# Patient Record
Sex: FEMALE | Race: WHITE | Employment: UNEMPLOYED | URBAN - METROPOLITAN AREA
[De-identification: names, ages, dates, MRNs, and addresses within clinical notes are randomized per-mention and may not be internally consistent; named-entity substitution may affect disease eponyms.]

---

## 2024-01-25 LAB
ABO, EXTERNAL RESULT: NORMAL
C. TRACHOMATIS, EXTERNAL RESULT: NEGATIVE
HEP B, EXTERNAL RESULT: NEGATIVE
HEPATITIS C ANTIBODY, EXTERNAL RESULT: NEGATIVE
HIV, EXTERNAL RESULT: NEGATIVE
N. GONORRHOEAE, EXTERNAL RESULT: NEGATIVE
RH FACTOR, EXTERNAL RESULT: POSITIVE
RPR, EXTERNAL RESULT: NON REACTIVE
RUBELLA TITER, EXTERNAL RESULT: NORMAL

## 2024-02-27 LAB — GBS, EXTERNAL RESULT: NEGATIVE

## 2024-03-08 ENCOUNTER — HOSPITAL ENCOUNTER (OUTPATIENT)
Facility: HOSPITAL | Age: 34
Discharge: HOME | End: 2024-03-08
Attending: OBSTETRICS & GYNECOLOGY | Admitting: OBSTETRICS & GYNECOLOGY
Payer: OTHER GOVERNMENT

## 2024-03-08 PROCEDURE — G0378 HOSPITAL OBSERVATION PER HR: HCPCS

## 2024-03-08 PROCEDURE — G0379 DIRECT REFER HOSPITAL OBSERV: HCPCS

## 2024-03-08 NOTE — H&P
Obstetrics Triage Note    Pt is a 33 y.o.female. at 38w. The patient presents with c/o ctx that started last night and got worse this afternoon.  States she called the office and spoke with someone who told her if she has more than 6 ctx in an hour to come to the hospital.  She also had decreased FM which has normalized since arrival to L&D.  The patient denies LOF, vaginal bleeding, N/V/F/C.  Pt reports good fetal movement.  Pregnancy uncomplicated.    No past medical history on file.    There were no vitals filed for this visit.    No data found.         EXAM:  Cervical Exam:  1-2/-3  Membranes:  Intact  Uterine Activity: None seen  Fetal Heart Rate: Reactive cat 1    Labs:  No results found for this or any previous visit (from the past 12 hour(s)).    @LASTPROCAMB([id number])@        ASSESSMENT:  IUP at 38 wks r/o labor    PLAN:   CE unchanged from office yesterday, not alex  FHT reassuring, good FM  Ok to discharge home, precautions reviewed.    Nathaly Tanner DO, FACOG  [facility]

## 2024-03-08 NOTE — PROGRESS NOTES
1436:  The patient presents to labor and delivery complaining of increased contractions since 6pm last night.      1559:  Dr. Tanner at the bedside, SVE is unchanged from the office where she was checked yesterday.      1615;  pt is discharged to home.  Discharge instructions given, pt verbalizes understanding.  Pt ambulated off of the unit with her .

## 2024-03-23 ENCOUNTER — HOSPITAL ENCOUNTER (OUTPATIENT)
Facility: HOSPITAL | Age: 34
Discharge: HOME | End: 2024-03-23
Attending: OBSTETRICS & GYNECOLOGY | Admitting: OBSTETRICS & GYNECOLOGY
Payer: OTHER GOVERNMENT

## 2024-03-23 VITALS
BODY MASS INDEX: 26.52 KG/M2 | OXYGEN SATURATION: 96 % | HEART RATE: 108 BPM | DIASTOLIC BLOOD PRESSURE: 66 MMHG | SYSTOLIC BLOOD PRESSURE: 95 MMHG | WEIGHT: 165 LBS | HEIGHT: 66 IN | RESPIRATION RATE: 16 BRPM | TEMPERATURE: 97.4 F

## 2024-03-23 PROCEDURE — 59025 FETAL NON-STRESS TEST: CPT

## 2024-03-23 PROCEDURE — G0378 HOSPITAL OBSERVATION PER HR: HCPCS

## 2024-03-23 PROCEDURE — 99212 OFFICE O/P EST SF 10 MIN: CPT

## 2024-03-23 PROCEDURE — 4A1HXCZ MONITORING OF PRODUCTS OF CONCEPTION, CARDIAC RATE, EXTERNAL APPROACH: ICD-10-PCS | Performed by: OBSTETRICS & GYNECOLOGY

## 2024-03-23 PROCEDURE — G0379 DIRECT REFER HOSPITAL OBSERV: HCPCS

## 2024-03-23 NOTE — PROGRESS NOTES
1235 Pt in room; changed into gown. Placed on EFM and TOCO. Pt reports she was having contractions last night with increasing frequency; q 2-5 minutes per pt. Pt denies LOF but reports slight spotting following membrane sweep yesterday. Pt endorses + fetal movement. Pt reports contraction pressure has increased throughout the morning which prompted her to seek treatment.     1315 Nicolette RANDOLPHM to bedside. Pt consents to SVE; 3-4 cm, 80%, -2.

## 2024-03-23 NOTE — H&P
History & Physical    Name: Karyn Manning MRN: [id number]  SSN: [id number]    YOB: 1990  Age: 33 y.o.  Sex: female        Subjective:     Estimated Date of Delivery: 3/23/24  OB History          2    Para   1    Term                AB        Living             SAB        IAB        Ectopic        Molar        Multiple        Live Births                    Ms. Manning is admitted with pregnancy at 40w0d with contractions off and on since 5 am. Reports she had a membrane sweep yesterday. Denies LOF. Reports some spotting as she wipes since sweep. Prenatal course was normal. Please see prenatal records for details.    Past Medical History:   Diagnosis Date    Anemia      History reviewed. No pertinent surgical history.  Social History     Occupational History    Not on file   Tobacco Use    Smoking status: Never    Smokeless tobacco: Never   Substance and Sexual Activity    Alcohol use: Not Currently    Drug use: Never    Sexual activity: Yes     Partners: Male     No family history on file.    No Known Allergies  Prior to Admission medications    Medication Sig Start Date End Date Taking? Authorizing Provider   Prenatal Vit-Fe Fumarate-FA (PRENATAL VITAMINS PO) Take by mouth    Provider, MD Félix        Review of Systems: Pertinent items are noted in HPI.    Objective:     Vitals:  Vitals:    24 1240   BP: 95/66   Pulse: (!) 108   Resp: 16   Temp: 97.4 °F (36.3 °C)   TempSrc: Oral   SpO2: 96%   Weight: 74.8 kg (165 lb)   Height: 1.676 m (5' 6\")        Physical Exam:  Patient without distress  Heart: Regular rate and rhythm  Lung: no wheezes, no rales, no rhonchi and normal respiratory effort  Abdomen: soft, nontender  Fundus: firm and non tender  Perineum: blood absent, amniotic fluid absent  Cervical Exam: 3 cm dilated    80% effaced    -2 station    Presenting Part: cephalic  Consistency: Soft  Lower Extremities:  - Edema No  Membranes:  Intact  Fetal Heart Rate:

## 2024-03-23 NOTE — PROGRESS NOTES
Labor Progress Note     Patient: Karyn Manning MRN: [id number]  SSN: [id number]    YOB: 1990  Age: 33 y.o.  Sex: female        Subjective:   Patient evaluated and reports contractions not intensified, doesn't feel like she is in labor  Objective:   Blood pressure 95/66, pulse (!) 108, temperature 97.4 °F (36.3 °C), temperature source Oral, resp. rate 16, height 1.676 m (5' 6\"), weight 74.8 kg (165 lb), last menstrual period 2023, SpO2 96 %.    Sterile Vaginal Exam: 3/80/  Membranes:  intact  EFM:  - Fetal Heart Rate: reactive    - Uterine Activity: irregular        Assessment:    SIUP @ 40w0d   Early labor vs. False labor  GBS neg  Category 1 fetal heart rate tracing   Plan:   No cervical change in 2 hours  Offered AOL with AROM or D/C  Patient desires D/C  Reviewed when to return   All questions answered, and pt/partner agree with plan of care      Signed By:  DELIO Cantrell CNM      3/23/2024 3:53 PM

## 2024-03-24 ENCOUNTER — HOSPITAL ENCOUNTER (INPATIENT)
Facility: HOSPITAL | Age: 34
LOS: 2 days | Discharge: HOME | End: 2024-03-26
Attending: OBSTETRICS & GYNECOLOGY | Admitting: OBSTETRICS & GYNECOLOGY
Payer: OTHER GOVERNMENT

## 2024-03-24 PROBLEM — O42.90 AMNIOTIC FLUID LEAKING: Status: ACTIVE | Noted: 2024-03-24

## 2024-03-24 PROBLEM — Z34.90 TERM PREGNANCY: Status: ACTIVE | Noted: 2024-03-24

## 2024-03-24 PROBLEM — Z3A.40 40 WEEKS GESTATION OF PREGNANCY: Status: ACTIVE | Noted: 2024-03-24

## 2024-03-24 LAB
ERYTHROCYTE [DISTWIDTH] IN BLOOD BY AUTOMATED COUNT: 13.4 % (ref 11.5–14.5)
HCT VFR BLD AUTO: 35.2 % (ref 35–47)
HGB BLD-MCNC: 11.8 G/DL (ref 11.5–16)
MCH RBC QN AUTO: 27 PG (ref 26–34)
MCHC RBC AUTO-ENTMCNC: 33.5 G/DL (ref 30–36.5)
MCV RBC AUTO: 80.5 FL (ref 80–99)
NRBC # BLD: 0 K/UL (ref 0–0.01)
NRBC BLD-RTO: 0 PER 100 WBC
PLATELET # BLD AUTO: 204 K/UL (ref 150–400)
PMV BLD AUTO: 10.5 FL (ref 8.9–12.9)
RBC # BLD AUTO: 4.37 M/UL (ref 3.8–5.2)
WBC # BLD AUTO: 17.1 K/UL (ref 3.6–11)

## 2024-03-24 PROCEDURE — 86850 RBC ANTIBODY SCREEN: CPT

## 2024-03-24 PROCEDURE — 1100000000 HC RM PRIVATE

## 2024-03-24 PROCEDURE — 86901 BLOOD TYPING SEROLOGIC RH(D): CPT

## 2024-03-24 PROCEDURE — 85027 COMPLETE CBC AUTOMATED: CPT

## 2024-03-24 PROCEDURE — 7210000100 HC LABOR FEE PER 1 HR: Performed by: ADVANCED PRACTICE MIDWIFE

## 2024-03-24 PROCEDURE — 86592 SYPHILIS TEST NON-TREP QUAL: CPT

## 2024-03-24 PROCEDURE — 86900 BLOOD TYPING SEROLOGIC ABO: CPT

## 2024-03-24 PROCEDURE — A4216 STERILE WATER/SALINE, 10 ML: HCPCS | Performed by: OBSTETRICS & GYNECOLOGY

## 2024-03-24 PROCEDURE — 2580000003 HC RX 258: Performed by: OBSTETRICS & GYNECOLOGY

## 2024-03-24 PROCEDURE — 2500000003 HC RX 250 WO HCPCS: Performed by: OBSTETRICS & GYNECOLOGY

## 2024-03-24 PROCEDURE — 36415 COLL VENOUS BLD VENIPUNCTURE: CPT

## 2024-03-24 RX ORDER — FENTANYL/BUPIVACAINE/NS/PF 2-1250MCG
12 PLASTIC BAG, INJECTION (ML) INJECTION CONTINUOUS
Status: DISCONTINUED | OUTPATIENT
Start: 2024-03-25 | End: 2024-03-25

## 2024-03-24 RX ORDER — ACETAMINOPHEN 325 MG/1
650 TABLET ORAL EVERY 4 HOURS PRN
Status: DISCONTINUED | OUTPATIENT
Start: 2024-03-24 | End: 2024-03-25

## 2024-03-24 RX ORDER — EPHEDRINE SULFATE/0.9% NACL/PF 25 MG/5 ML
10 SYRINGE (ML) INTRAVENOUS EVERY 5 MIN PRN
Status: DISCONTINUED | OUTPATIENT
Start: 2024-03-24 | End: 2024-03-25

## 2024-03-24 RX ORDER — SODIUM CHLORIDE, SODIUM LACTATE, POTASSIUM CHLORIDE, AND CALCIUM CHLORIDE .6; .31; .03; .02 G/100ML; G/100ML; G/100ML; G/100ML
1000 INJECTION, SOLUTION INTRAVENOUS PRN
Status: DISCONTINUED | OUTPATIENT
Start: 2024-03-24 | End: 2024-03-25

## 2024-03-24 RX ORDER — SODIUM CHLORIDE, SODIUM LACTATE, POTASSIUM CHLORIDE, AND CALCIUM CHLORIDE .6; .31; .03; .02 G/100ML; G/100ML; G/100ML; G/100ML
500 INJECTION, SOLUTION INTRAVENOUS PRN
Status: DISCONTINUED | OUTPATIENT
Start: 2024-03-24 | End: 2024-03-25

## 2024-03-24 RX ORDER — TERBUTALINE SULFATE 1 MG/ML
0.25 INJECTION, SOLUTION SUBCUTANEOUS
Status: DISCONTINUED | OUTPATIENT
Start: 2024-03-24 | End: 2024-03-25

## 2024-03-24 RX ORDER — ONDANSETRON 2 MG/ML
4 INJECTION INTRAMUSCULAR; INTRAVENOUS EVERY 6 HOURS PRN
Status: DISCONTINUED | OUTPATIENT
Start: 2024-03-24 | End: 2024-03-25

## 2024-03-24 RX ORDER — NALOXONE HYDROCHLORIDE 0.4 MG/ML
INJECTION, SOLUTION INTRAMUSCULAR; INTRAVENOUS; SUBCUTANEOUS PRN
Status: DISCONTINUED | OUTPATIENT
Start: 2024-03-24 | End: 2024-03-25

## 2024-03-24 RX ADMIN — SODIUM CHLORIDE, POTASSIUM CHLORIDE, SODIUM LACTATE AND CALCIUM CHLORIDE 1000 ML: 600; 310; 30; 20 INJECTION, SOLUTION INTRAVENOUS at 22:06

## 2024-03-24 RX ADMIN — FAMOTIDINE 20 MG: 10 INJECTION, SOLUTION INTRAVENOUS at 22:24

## 2024-03-24 RX ADMIN — SODIUM CHLORIDE, POTASSIUM CHLORIDE, SODIUM LACTATE AND CALCIUM CHLORIDE 1000 ML: 600; 310; 30; 20 INJECTION, SOLUTION INTRAVENOUS at 23:40

## 2024-03-25 ENCOUNTER — ANESTHESIA EVENT (OUTPATIENT)
Facility: HOSPITAL | Age: 34
End: 2024-03-25
Payer: OTHER GOVERNMENT

## 2024-03-25 ENCOUNTER — ANESTHESIA (OUTPATIENT)
Facility: HOSPITAL | Age: 34
End: 2024-03-25
Payer: OTHER GOVERNMENT

## 2024-03-25 LAB
ABO + RH BLD: NORMAL
BLOOD GROUP ANTIBODIES SERPL: NORMAL
RPR SER QL: NONREACTIVE
SPECIMEN EXP DATE BLD: NORMAL

## 2024-03-25 PROCEDURE — 1120000000 HC RM PRIVATE OB

## 2024-03-25 PROCEDURE — 7220000101 HC DELIVERY VAGINAL/SINGLE: Performed by: ADVANCED PRACTICE MIDWIFE

## 2024-03-25 PROCEDURE — 6360000002 HC RX W HCPCS: Performed by: OBSTETRICS & GYNECOLOGY

## 2024-03-25 PROCEDURE — 00HU33Z INSERTION OF INFUSION DEVICE INTO SPINAL CANAL, PERCUTANEOUS APPROACH: ICD-10-PCS | Performed by: ANESTHESIOLOGY

## 2024-03-25 PROCEDURE — 2500000003 HC RX 250 WO HCPCS: Performed by: ANESTHESIOLOGY

## 2024-03-25 PROCEDURE — 3700000025 EPIDURAL BLOCK: Performed by: ANESTHESIOLOGY

## 2024-03-25 PROCEDURE — 2580000003 HC RX 258: Performed by: OBSTETRICS & GYNECOLOGY

## 2024-03-25 PROCEDURE — 7210000100 HC LABOR FEE PER 1 HR: Performed by: ADVANCED PRACTICE MIDWIFE

## 2024-03-25 PROCEDURE — 6370000000 HC RX 637 (ALT 250 FOR IP): Performed by: OBSTETRICS & GYNECOLOGY

## 2024-03-25 PROCEDURE — 94761 N-INVAS EAR/PLS OXIMETRY MLT: CPT

## 2024-03-25 PROCEDURE — 3700000156 HC EPIDURAL ANESTHESIA: Performed by: ANESTHESIOLOGY

## 2024-03-25 PROCEDURE — 6370000000 HC RX 637 (ALT 250 FOR IP): Performed by: ADVANCED PRACTICE MIDWIFE

## 2024-03-25 PROCEDURE — 51702 INSERT TEMP BLADDER CATH: CPT

## 2024-03-25 RX ORDER — SODIUM CHLORIDE 9 MG/ML
INJECTION, SOLUTION INTRAVENOUS PRN
Status: DISCONTINUED | OUTPATIENT
Start: 2024-03-25 | End: 2024-03-26 | Stop reason: HOSPADM

## 2024-03-25 RX ORDER — LANOLIN/MINERAL OIL
LOTION (ML) TOPICAL PRN
Status: DISCONTINUED | OUTPATIENT
Start: 2024-03-25 | End: 2024-03-26 | Stop reason: HOSPADM

## 2024-03-25 RX ORDER — ONDANSETRON 4 MG/1
4 TABLET, ORALLY DISINTEGRATING ORAL EVERY 6 HOURS PRN
Status: DISCONTINUED | OUTPATIENT
Start: 2024-03-25 | End: 2024-03-25

## 2024-03-25 RX ORDER — SODIUM CHLORIDE, SODIUM LACTATE, POTASSIUM CHLORIDE, CALCIUM CHLORIDE 600; 310; 30; 20 MG/100ML; MG/100ML; MG/100ML; MG/100ML
INJECTION, SOLUTION INTRAVENOUS CONTINUOUS
Status: DISCONTINUED | OUTPATIENT
Start: 2024-03-25 | End: 2024-03-25

## 2024-03-25 RX ORDER — FENTANYL CITRATE 50 UG/ML
INJECTION, SOLUTION INTRAMUSCULAR; INTRAVENOUS PRN
Status: DISCONTINUED | OUTPATIENT
Start: 2024-03-25 | End: 2024-03-25 | Stop reason: SDUPTHER

## 2024-03-25 RX ORDER — SODIUM CHLORIDE 0.9 % (FLUSH) 0.9 %
5-40 SYRINGE (ML) INJECTION EVERY 12 HOURS SCHEDULED
Status: DISCONTINUED | OUTPATIENT
Start: 2024-03-25 | End: 2024-03-26 | Stop reason: HOSPADM

## 2024-03-25 RX ORDER — LIDOCAINE HYDROCHLORIDE AND EPINEPHRINE 15; 5 MG/ML; UG/ML
INJECTION, SOLUTION EPIDURAL PRN
Status: DISCONTINUED | OUTPATIENT
Start: 2024-03-25 | End: 2024-03-25 | Stop reason: SDUPTHER

## 2024-03-25 RX ORDER — CARBOPROST TROMETHAMINE 250 UG/ML
250 INJECTION, SOLUTION INTRAMUSCULAR PRN
Status: DISCONTINUED | OUTPATIENT
Start: 2024-03-25 | End: 2024-03-25

## 2024-03-25 RX ORDER — ONDANSETRON 2 MG/ML
4 INJECTION INTRAMUSCULAR; INTRAVENOUS EVERY 6 HOURS PRN
Status: DISCONTINUED | OUTPATIENT
Start: 2024-03-25 | End: 2024-03-25

## 2024-03-25 RX ORDER — METHYLERGONOVINE MALEATE 0.2 MG/ML
200 INJECTION INTRAVENOUS PRN
Status: DISCONTINUED | OUTPATIENT
Start: 2024-03-25 | End: 2024-03-25

## 2024-03-25 RX ORDER — IBUPROFEN 800 MG/1
800 TABLET ORAL EVERY 8 HOURS SCHEDULED
Status: DISCONTINUED | OUTPATIENT
Start: 2024-03-25 | End: 2024-03-26 | Stop reason: HOSPADM

## 2024-03-25 RX ORDER — SODIUM CHLORIDE 0.9 % (FLUSH) 0.9 %
5-40 SYRINGE (ML) INJECTION PRN
Status: DISCONTINUED | OUTPATIENT
Start: 2024-03-25 | End: 2024-03-26 | Stop reason: HOSPADM

## 2024-03-25 RX ORDER — ACETAMINOPHEN 500 MG
1000 TABLET ORAL ONCE
Status: COMPLETED | OUTPATIENT
Start: 2024-03-25 | End: 2024-03-25

## 2024-03-25 RX ORDER — DOCUSATE SODIUM 100 MG/1
100 CAPSULE, LIQUID FILLED ORAL 2 TIMES DAILY
Status: DISCONTINUED | OUTPATIENT
Start: 2024-03-25 | End: 2024-03-26 | Stop reason: HOSPADM

## 2024-03-25 RX ORDER — TRANEXAMIC ACID 10 MG/ML
1000 INJECTION, SOLUTION INTRAVENOUS
Status: DISCONTINUED | OUTPATIENT
Start: 2024-03-25 | End: 2024-03-25

## 2024-03-25 RX ORDER — MISOPROSTOL 200 UG/1
400 TABLET ORAL PRN
Status: DISCONTINUED | OUTPATIENT
Start: 2024-03-25 | End: 2024-03-25

## 2024-03-25 RX ORDER — ACETAMINOPHEN 500 MG
1000 TABLET ORAL EVERY 8 HOURS SCHEDULED
Status: DISCONTINUED | OUTPATIENT
Start: 2024-03-25 | End: 2024-03-26 | Stop reason: HOSPADM

## 2024-03-25 RX ORDER — BUPIVACAINE HYDROCHLORIDE AND EPINEPHRINE 2.5; 5 MG/ML; UG/ML
INJECTION, SOLUTION EPIDURAL; INFILTRATION; INTRACAUDAL; PERINEURAL PRN
Status: DISCONTINUED | OUTPATIENT
Start: 2024-03-25 | End: 2024-03-25 | Stop reason: SDUPTHER

## 2024-03-25 RX ADMIN — LIDOCAINE HYDROCHLORIDE AND EPINEPHRINE 4.5 ML: 15; 5 INJECTION, SOLUTION EPIDURAL at 00:08

## 2024-03-25 RX ADMIN — OXYTOCIN 999 MILLI-UNITS/MIN: 10 INJECTION, SOLUTION INTRAMUSCULAR; INTRAVENOUS at 04:55

## 2024-03-25 RX ADMIN — SODIUM CHLORIDE, POTASSIUM CHLORIDE, SODIUM LACTATE AND CALCIUM CHLORIDE: 600; 310; 30; 20 INJECTION, SOLUTION INTRAVENOUS at 03:38

## 2024-03-25 RX ADMIN — DOCUSATE SODIUM 100 MG: 100 CAPSULE, LIQUID FILLED ORAL at 08:01

## 2024-03-25 RX ADMIN — SODIUM CHLORIDE, POTASSIUM CHLORIDE, SODIUM LACTATE AND CALCIUM CHLORIDE: 600; 310; 30; 20 INJECTION, SOLUTION INTRAVENOUS at 02:32

## 2024-03-25 RX ADMIN — BUPIVACAINE HYDROCHLORIDE AND EPINEPHRINE 8 ML: 2.5; 5 INJECTION, SOLUTION EPIDURAL; INFILTRATION; INTRACAUDAL; PERINEURAL at 03:57

## 2024-03-25 RX ADMIN — IBUPROFEN 800 MG: 800 TABLET, FILM COATED ORAL at 08:01

## 2024-03-25 RX ADMIN — SODIUM CHLORIDE 5 MILLION UNITS: 900 INJECTION INTRAVENOUS at 00:55

## 2024-03-25 RX ADMIN — FENTANYL CITRATE 100 MCG: 50 INJECTION, SOLUTION INTRAMUSCULAR; INTRAVENOUS at 03:57

## 2024-03-25 RX ADMIN — ACETAMINOPHEN 1000 MG: 500 TABLET ORAL at 00:57

## 2024-03-25 RX ADMIN — Medication 12 ML/HR: at 00:20

## 2024-03-25 RX ADMIN — IBUPROFEN 800 MG: 800 TABLET, FILM COATED ORAL at 17:54

## 2024-03-25 RX ADMIN — LIDOCAINE HYDROCHLORIDE AND EPINEPHRINE 3 ML: 15; 5 INJECTION, SOLUTION EPIDURAL at 00:03

## 2024-03-25 ASSESSMENT — PAIN DESCRIPTION - LOCATION
LOCATION: ABDOMEN

## 2024-03-25 ASSESSMENT — PAIN DESCRIPTION - ORIENTATION
ORIENTATION: MID
ORIENTATION: LOWER

## 2024-03-25 ASSESSMENT — PAIN SCALES - GENERAL
PAINLEVEL_OUTOF10: 2
PAINLEVEL_OUTOF10: 4

## 2024-03-25 ASSESSMENT — ENCOUNTER SYMPTOMS
VOMITING: 0
DIARRHEA: 0
NAUSEA: 0
CONSTIPATION: 0
CHEST TIGHTNESS: 0
RHINORRHEA: 0
EYE PAIN: 0
SHORTNESS OF BREATH: 0
BACK PAIN: 0
TROUBLE SWALLOWING: 0

## 2024-03-25 ASSESSMENT — PAIN DESCRIPTION - DESCRIPTORS
DESCRIPTORS: SORE
DESCRIPTORS: SORE

## 2024-03-25 ASSESSMENT — PAIN - FUNCTIONAL ASSESSMENT
PAIN_FUNCTIONAL_ASSESSMENT: ACTIVITIES ARE NOT PREVENTED
PAIN_FUNCTIONAL_ASSESSMENT: ACTIVITIES ARE NOT PREVENTED

## 2024-03-25 NOTE — ANESTHESIA PROCEDURE NOTES
Epidural Block    Patient location during procedure: OB  Start time: 3/24/2024 11:58 PM  End time: 3/25/2024 12:03 AM  Reason for block: labor epidural  Staffing  Anesthesiologist: Harrison Gambino MD  Performed by: Harrison Gambino MD  Authorized by: Harrison Gambino MD    Epidural  Patient position: sitting  Prep: Betadine  Patient monitoring: frequent blood pressure checks and continuous pulse ox  Approach: midline  Location: L3-4  Injection technique: DUSTY air  Provider prep: mask and sterile gloves  Needle  Needle type: Tuohy   Needle gauge: 17 G  Needle length: 3.5 in  Needle insertion depth: 4 cm  Catheter type: multi-orifice  Catheter size: 20 G  Catheter at skin depth: 9 cm  Test dose: negativeCatheter Secured: tegaderm and tape  Assessment  Hemodynamics: stable  Attempts: 1  Outcomes: patient tolerated procedure well  Additional Notes  Pt voiced immediate relief   Tolerated well  PCEA explained AVU   See TD for BP/HR/Sats/FHT all ok

## 2024-03-25 NOTE — LACTATION NOTE
This note was copied from a baby's chart.  Mother states baby has latched with assistance and nursed successfully. Mother states, \"I was told my baby has a tongue tie.\" Mother states her first child (now 6 also was diagnosed with tongue tie at age 3).  Offered to assess a latch and work with mother through breastfeeding techniques.   Mother declines assistance with breastfeeding at this time.  Asked mother if helpful to work through evidence based practice orofacial exercises including suck training.  Mother declines at this time. Mother encouraged to call next feed.     Discussed with mother her plan for feeding.  Reviewed the benefits of exclusive breast milk feeding during the hospital stay.   Informed her of the risks of using formula to supplement in the first few days of life as well as the benefits of successful breast milk feeding; referred her to the Breastfeeding booklet about this information.   She acknowledges understanding of information reviewed and states that it is her plan to breastfeed her infant.  Will support her choice and offer additional information as needed.     Reviewed breastfeeding basics:  How milk is made and normal  breastfeeding behaviors discussed.  Supply and demand,  stomach size, early feeding cues, skin to skin bonding with comfortable positioning and baby led latch-on reviewed.  How to identify signs of successful breastfeeding sessions reviewed; education on asymetrical latch, signs of effective latching vs shallow, in-effective latching, normal  feeding frequency and duration and expected infant output discussed.  Normal course of breastfeeding discussed including the AAP's recommendation that children receive exclusive breast milk feedings for the first six months of life with breast milk feedings to continue through the first year of life and/or beyond as complimentary table foods are added.  Breastfeeding Booklet and Warm line information provided with

## 2024-03-25 NOTE — H&P
History & Physical    Name: Karyn Manning MRN: [id number]  SSN: [id number]    YOB: 1990  Age: 33 y.o.  Sex: female        Estimated Date of Delivery: 3/23/24  OB History    Para Term  AB Living   2 1 1     1   SAB IAB Ectopic Molar Multiple Live Births             1      # Outcome Date GA Lbr Yousif/2nd Weight Sex Delivery Anes PTL Lv   2 Current            1 Term     M Vag-Spont   TABATHA       CC: SROM    Ms. Manning is a 34 yo  @ 40w1d, JAIRO 3/23/24, pt of Dr Almazan, who presents with complaint of SROM between 8 to 820pm. Ctxs q 5 mins. Patient was triaged on l&d yesterday, discharged to home with no change in cervix at 3cm/80%/-2. She lost her mucus plug- scant blood noted in the discharge. +FM. Prenatal course was normal.  Please see prenatal records for details. Patient's  at bedside.     Past Medical History:   Diagnosis Date    Anemia      History reviewed. No pertinent surgical history.  Social History     Occupational History    Not on file   Tobacco Use    Smoking status: Never    Smokeless tobacco: Never   Substance and Sexual Activity    Alcohol use: Not Currently    Drug use: Never    Sexual activity: Yes     Partners: Male     Comment: denies h/o STIs     Family History   Problem Relation Age of Onset    No Known Problems Mother     No Known Problems Father        Allergies   Allergen Reactions    Escitalopram Hallucinations    Other Rash     PEANUTS     Prior to Admission medications    Medication Sig Start Date End Date Taking? Authorizing Provider   Prenatal Vit-Fe Fumarate-FA (PRENATAL VITAMINS PO) Take by mouth    Provider, Félix, MD        Review of Systems   Constitutional:  Negative for chills, fatigue and fever (but feels hot).   HENT:  Negative for rhinorrhea and trouble swallowing.    Eyes:  Negative for pain.   Respiratory:  Negative for chest tightness and shortness of breath.    Cardiovascular:  Negative for chest pain.   Gastrointestinal:

## 2024-03-25 NOTE — DISCHARGE INSTRUCTIONS
in your phone.  Go to [employer] for more information or to chat online.  Call your doctor now or seek immediate medical care if:    You are having trouble caring for yourself or your baby.     You hear voices.   Contact your doctor if:    You have problems with your medicines.     You do not get better as expected.   Follow-up care is a key part of your treatment and safety. Be sure to make and go to all appointments, and call your doctor if you are having problems. It's also a good idea to know your test results and keep a list of the medicines you take.  Where can you learn more?  Go to [email or web address]://[email or web address] and enter Y765 to learn more about \"Depression After Childbirth: Care Instructions.\"  Current as of: June 24, 2023               Content Version: 14.0  © 2006-2024 [employer].   Care instructions adapted under license by [employer]. If you have questions about a medical condition or this instruction, always ask your healthcare professional. [employer] disclaims any warranty or liability for your use of this information.

## 2024-03-25 NOTE — ANESTHESIA POSTPROCEDURE EVALUATION
Department of Anesthesiology  Postprocedure Note    Patient: Karyn Manning  MRN: [id number]  YOB: 1990  Date of evaluation: 3/25/2024    Procedure Summary       Date: 03/24/24 Room / Location:     Anesthesia Start: 2358 Anesthesia Stop: 03/25/24 0448    Procedure: Labor Analgesia Diagnosis:     Scheduled Providers:  Responsible Provider: Harrison Gambino MD    Anesthesia Type: epidural ASA Status: 2 - Emergent            Anesthesia Type: No value filed.    Patrick Phase I:      Patrick Phase II:      Anesthesia Post Evaluation    No notable events documented.

## 2024-03-25 NOTE — LACTATION NOTE
This note was copied from a baby's chart.  Rounding for LC consult.  Primary RN assisted to latch mother in cradled position.  Reviewed good alignment to achieve deep latch (body to belly; nose to nipple, lead with chin).  Noting that during nursing, infant seems to be recruiting jaw in a piston like motion to remove milk, likely resulting in the \"pinchy\" feeling described by mother.  Orofacial exercises reviewed with parents. Goal is to decrease reliance on cheek/jaw compensations and strengthen tongue.  Community resources shared for SLP/IBCLC. Reviewed breastfeeding basics and output expectations with parents.  Mother aware to call for assistance with feeds.     Orofacial exercises are light touch, gentle hands on techniques predominantly involving the gape response, facilitating tongue movement, and promote motor awareness.  It can improve mobility of the tissues by decreasing the restriction and tension patterns in the face and increase optimal latch.  Pt will successfully establish breastfeeding by feeding in response to early feeding cues   or wake every 3h, will obtain deep latch, and will keep log of feedings/output.  Taught to BF at hunger cues and or q 2-3 hrs and to offer 10-20 drops of hand expressed colostrum at any non-feeds.      Left Breast:  (did not evalutate this consult)           Position and Latch: With assistance     Maternal Response: Attentive           Latch: Grasps breast, tongue down, lips flanged, rhythmic sucking  Audible Swallowing: A few with stimulation  Type of Nipple: Everted (after stimulation)  Comfort (Breast/Nipple): Filling, red/small blisters/bruises, mild/mod discomfort (feels \"pinchy\")  Hold (Positioning): Full assist, teach one side, mother does other, staff holds  LATCH Score: 7

## 2024-03-25 NOTE — ANESTHESIA PRE PROCEDURE
Department of Anesthesiology  Preprocedure Note       Name:  Karyn Manning   Age:  33 y.o.  :  1990                                          MRN:  [id number]         Date:  3/25/2024      Surgeon: * Surgery not found *    Procedure:     Medications prior to admission:   Prior to Admission medications    Medication Sig Start Date End Date Taking? Authorizing Provider   Prenatal Vit-Fe Fumarate-FA (PRENATAL VITAMINS PO) Take by mouth    Provider, MD Félix       Current medications:    Current Facility-Administered Medications   Medication Dose Route Frequency Provider Last Rate Last Admin   • terbutaline (BRETHINE) injection 0.25 mg  0.25 mg SubCUTAneous Once PRN Brock Swanson MD       • lactated ringers bolus 500 mL  500 mL IntraVENous PRN Brock Swanson .7 mL/hr at 24 2227 Restarted at 24 222    Or   • lactated ringers bolus 1,000 mL  1,000 mL IntraVENous PRN Brock Swanson .9 mL/hr at 24 2340 1,000 mL at 24 2340   • acetaminophen (TYLENOL) tablet 650 mg  650 mg Oral Q4H PRN Brock Swanson MD       • fentaNYL 2 mcg/mL BUPivacaine 0.125% in sodium chloride 0.9% 100 mL epidural infusion  12 mL/hr Epidural Continuous Harrison Gambino MD       • naloxone 0.4 mg in 10 mL sodium chloride syringe   IntraVENous PRN Harrison Gambino MD       • ondansetron (ZOFRAN) injection 4 mg  4 mg IntraVENous Q6H PRN Harrison Gambino MD       • ePHEDrine injection 10 mg  10 mg IntraVENous Q5 Min PRN Harrison Gambino MD           Allergies:    Allergies   Allergen Reactions   • Escitalopram Hallucinations   • Other Rash     PEANUTS       Problem List:    Patient Active Problem List   Diagnosis Code   • Term pregnancy Z34.90   • 40 weeks gestation of pregnancy Z3A.40   • Amniotic fluid leaking O42.90       Past Medical History:        Diagnosis Date   • Anemia        Past Surgical History:  History reviewed. No pertinent surgical history.    Social

## 2024-03-25 NOTE — DISCHARGE SUMMARY
Obstetrical Discharge Summary     Name: Karyn Manning MRN: [id number]  SSN: [id number]    YOB: 1990  Age: 33 y.o.  Sex: female      Admit Date: 3/24/2024    Discharge Date: 3/26/2024     Attending Physician:  Kyra Young MD     Delivering Physician:  GALI Marquez     * Admission Diagnoses:   IUP @ 40w2d      * Discharge Diagnoses:   Delivery of a VFI via TSVD by Kyra Young MD on 3/25/2024.  Apgars were 8 and 9.      No lacerations      Additional Diagnoses:      No results found for: \"RUBELLAEXT\", \"GRBSEXT\"   Immunization History   Administered Date(s) Administered    COVID-19, PFIZER PURPLE top, DILUTE for use, (age 12 y+), 30mcg/0.3mL 05/03/2021, 05/24/2021       * Procedures:   TSVD         * Discharge Condition: good    * Hospital Course: Normal hospital course following the delivery.    * Disposition: Home    Discharge Medications:      Medication List        ASK your doctor about these medications      PRENATAL VITAMINS PO              * Follow-up Care/Patient Instructions:  Activity: Activity as tolerated  Diet: Regular Diet  Wound Care: As directed      Followup in 7-14 days and then at 6 weeks for PP check        Signed By:  Kyra Young MD     March 25, 2024

## 2024-03-25 NOTE — L&D DELIVERY NOTE
Vessels  Complications: None  Delayed Cord Clamping?: Yes  Cord Clamped Date/Time: 3/25/2024 04:50:05  Cord Blood Disposition: Lab  Gases Sent?: No              Placenta    Date/Time: 3/25/2024 04:52:42  Removal: Expressed  Appearance: Intact  Disposition: Discarded       Lacerations    Episiotomy: None  Perineal Lacerations: None  Other Lacerations: no non-perineal laceration  Number of Repair Packets: 1       Vaginal Counts    Initial Count Personnel: ALYSON WARREN RN  Initial Count Verified By: STACI ASKEW CNM  Intial Sponge Count: Correct Intial Needles Count: Correct Intial Instruments Count: Correct   Final Sponges Count: Correct Final Needles  Count: Correct Final Instruments Count: Correct   Final Count Personnel: STACI ASKEW CNM  Final Count Verified By: ALYSON WARREN RN  Accurate Final Count?: No       Blood Loss  Mother: Karyn Manning #[id number]     Start of Mother's Information      Delivery Blood Loss  24 1648 - 24 0527      Quantitative Blood Loss (mL) Hospital Encounter 150 grams    Total  150 mL               End of Mother's Information  Mother: Karyn Manning #[id number]                Delivery Providers    Delivering clinician: Bessy Askew APRN - CNM     Provider Role    Bessy Askew APRN - CNM Midwife    Tamie Berry RN Primary Nurse    Deanna Hall RN Primary  Nurse    Mere Toure RN Charge Nurse    Alyson Warren RN Staff Nurse               Assessment    Living Status: Living        Skin Color:   Heart Rate:   Reflex Irritability:   Muscle Tone:   Respiratory Effort:   Total:            1 Minute:    0    2    2    2    2    8         5 Minute:    1    2    2    2    2    9                                        Apgars Assigned By: STACI HALL RN              Resuscitation    Method: Stimulation, Bulb Suction, Room Air             [place] Measurements

## 2024-03-26 VITALS
OXYGEN SATURATION: 98 % | DIASTOLIC BLOOD PRESSURE: 63 MMHG | RESPIRATION RATE: 16 BRPM | HEART RATE: 93 BPM | TEMPERATURE: 97.9 F | SYSTOLIC BLOOD PRESSURE: 95 MMHG

## 2024-03-26 PROBLEM — Z34.90 TERM PREGNANCY: Status: RESOLVED | Noted: 2024-03-24 | Resolved: 2024-03-26

## 2024-03-26 PROBLEM — Z3A.40 40 WEEKS GESTATION OF PREGNANCY: Status: RESOLVED | Noted: 2024-03-24 | Resolved: 2024-03-26

## 2024-03-26 PROBLEM — O42.90 AMNIOTIC FLUID LEAKING: Status: RESOLVED | Noted: 2024-03-24 | Resolved: 2024-03-26

## 2024-03-26 LAB
ERYTHROCYTE [DISTWIDTH] IN BLOOD BY AUTOMATED COUNT: 14 % (ref 11.5–14.5)
HCT VFR BLD AUTO: 33 % (ref 35–47)
HGB BLD-MCNC: 11 G/DL (ref 11.5–16)
MCH RBC QN AUTO: 27 PG (ref 26–34)
MCHC RBC AUTO-ENTMCNC: 33.3 G/DL (ref 30–36.5)
MCV RBC AUTO: 81.1 FL (ref 80–99)
NRBC # BLD: 0 K/UL (ref 0–0.01)
NRBC BLD-RTO: 0 PER 100 WBC
PLATELET # BLD AUTO: 178 K/UL (ref 150–400)
PMV BLD AUTO: 10.4 FL (ref 8.9–12.9)
RBC # BLD AUTO: 4.07 M/UL (ref 3.8–5.2)
WBC # BLD AUTO: 21.6 K/UL (ref 3.6–11)

## 2024-03-26 PROCEDURE — 36415 COLL VENOUS BLD VENIPUNCTURE: CPT

## 2024-03-26 PROCEDURE — 85027 COMPLETE CBC AUTOMATED: CPT

## 2024-03-26 PROCEDURE — 94761 N-INVAS EAR/PLS OXIMETRY MLT: CPT

## 2024-03-26 PROCEDURE — 6370000000 HC RX 637 (ALT 250 FOR IP): Performed by: ADVANCED PRACTICE MIDWIFE

## 2024-03-26 RX ADMIN — DOCUSATE SODIUM 100 MG: 100 CAPSULE, LIQUID FILLED ORAL at 09:49

## 2024-03-26 RX ADMIN — IBUPROFEN 800 MG: 800 TABLET, FILM COATED ORAL at 05:03

## 2024-03-26 RX ADMIN — IBUPROFEN 800 MG: 800 TABLET, FILM COATED ORAL at 12:45

## 2024-03-26 ASSESSMENT — PAIN SCALES - GENERAL
PAINLEVEL_OUTOF10: 5
PAINLEVEL_OUTOF10: 2

## 2024-03-26 ASSESSMENT — PAIN DESCRIPTION - ORIENTATION
ORIENTATION: LOWER
ORIENTATION: LOWER

## 2024-03-26 ASSESSMENT — PAIN DESCRIPTION - DESCRIPTORS
DESCRIPTORS: CRAMPING
DESCRIPTORS: ACHING

## 2024-03-26 ASSESSMENT — PAIN - FUNCTIONAL ASSESSMENT: PAIN_FUNCTIONAL_ASSESSMENT: ACTIVITIES ARE NOT PREVENTED

## 2024-03-26 ASSESSMENT — PAIN DESCRIPTION - LOCATION
LOCATION: ABDOMEN
LOCATION: ABDOMEN

## 2024-03-26 NOTE — LACTATION NOTE
This note was copied from a baby's chart.  Mother states she is sore from only nursing baby on left breast.  Mother states baby possibly has a tongue tie.  Tongue noted to have limited range of motion and heart shaped  when baby is crying.  Mother states she has tried nursing with a shield but caused nipple pain.      Assisted mother with nursing on right breast.  Mother very awkward in presentation of breast.  Showed mother C hold. Baby unable to maintain latch.  Lots of sucking noises noted, baby noted to be sucking her own tongue while at breast.  Mother unable to tell if baby is latched.  Proper application of shield and latching baby with shield taught.  Baby latched well with rhythmic sucks. Baby drowsy, mother ended feeding.  Encouraged mother to pump to ensure adequate milk production since baby is having difficulty at this time with nursing.  Mother states she has a pump at home. Outpatient resources given for baby.    Discharge instructions reviewed, printed info given.    Chart shows numerous feedings, void, stool WNL.  Discussed importance of monitoring outputs and feedings on first week of life.  Discussed ways to tell if baby is  getting enough breast milk, ie  voids and stools, change in color of stool, and return to birth wt within 2 weeks.  Follow up with pediatrician visit for weight check in 1-2 days (per AAP guidelines.)  Encouraged to call Warm Line  [phone]  for any questions/problems that arise. Mother also given breastfeeding support group dates and times for any future needs    Engorgement Care Guidelines:  Reviewed how milk is made and normal phases of milk production.  Taught care of engorged breasts - physiologic breastfeeding encouraged with use of cool packs (no ice directly on skin). Consider use of NSAIDS where appropriate for discomfort and inflammation. Can employ light touch, lymphatic drainage techniques on tender grandular tissues. Anticipatory guidance shared.

## 2024-11-04 NOTE — PROGRESS NOTES
0700 - Verbal shift change report given to ALONDRA Ordonez (oncoming nurse) by ALONDRA Willett (offgoing nurse). Report included the following information Nurse Handoff Report.     0740 - TRANSFER - OUT REPORT:    Verbal report given to ALONDRA Hoff on Karyn Manning being transferred to [facility] for routine progression of patient care       Report consisted of patient's Situation, Background, Assessment and   Recommendations(SBAR).     Information from the following report(s) Nurse Handoff Report was reviewed with the receiving nurse.           Lines:   Peripheral IV 03/24/24 Left Hand (Active)   Site Assessment Clean, dry & intact 03/24/24 2200   Line Status Infusing 03/24/24 2200   Phlebitis Assessment No symptoms 03/24/24 2200   Infiltration Assessment 0 03/24/24 2200   Alcohol Cap Used No 03/24/24 2200   Dressing Status Clean, dry & intact 03/24/24 2200        Opportunity for questions and clarification was provided.      Patient transported with:  Registered Nurse        
2055:  The patient presents to labor and delivery complaining of leaking of clear fluid since 2000.  The patient denies contractions, vaginal bleeding or decreased fetal movement.      2300:  SBAR report to Tamie Berry RN  
2300: Bedside and Verbal shift change report given to RYANNE Berry RN (oncoming nurse) by RYANNE Dumont RN (offgoing nurse). Report included the following information Nurse Handoff Report, Intake/Output, MAR, Recent Results, and Event Log.     2315: Pt bolusing for epidural at this time.     2343: This RN called Kt MCCARTHY for epidural.     2355: This RN and Kt MCCARTHY at bedside for epidural placement.     2358: Time out     0003: Test dose    0309: This RN and Sky MCCARTHY at bedside. SVE 5/80/-3. VORB to start pitocin.     0350: This RN called Immanuel MCCARTHY for redose.     0357: Pt received redose.     0414: This R at bedside, SVE 8/100/0.     0428: This RN and GONZALO Warren RN. SVE complete.     0428: Patient actively pushing.  RN remains in continuous attendance at the bedside.  Assessment & evaluation of fetal heart rate ongoing via continuous EFM.     0448: RN remained at bedside throughout pushing.  EFM continuously assessed.  Vaginal delivery of viable infant.     0700: Verbal shift change report given to SIMRAN Alexis RN (oncoming nurse) by RYANNE Berry RN (offgoing nurse). Report included the following information Nurse Handoff Report, Intake/Output, MAR, Recent Results, Med Rec Status, and Event Log.                      
L&D Progress Note    Admit Date: 3/24/2024      Subjective:     Patient has some pressure and mild pain with contractions. Received epidural a few hrs ago.        Objective:     Patient Vitals for the past 8 hrs:   BP Temp Temp src Pulse Resp SpO2   03/25/24 0139 106/68 -- -- (!) 112 -- 97 %   03/25/24 0124 (!) 98/59 -- -- (!) 118 -- --   03/25/24 0054 108/67 -- -- (!) 122 -- --   03/25/24 0040 107/61 -- -- (!) 124 -- 97 %   03/25/24 0020 112/62 -- -- (!) 110 -- 98 %   03/25/24 0014 (!) 108/55 -- -- (!) 114 -- --   03/25/24 0009 (!) 140/66 -- -- (!) 109 -- --   03/25/24 0005 (!) 102/56 -- -- (!) 120 -- 97 %   03/25/24 0002 (!) 104/56 -- -- (!) 114 -- --   03/24/24 2314 (!) 96/51 99.7 °F (37.6 °C) Oral (!) 109 18 98 %   03/24/24 2103 -- -- -- -- -- 99 %   03/24/24 2100 107/62 99.7 °F (37.6 °C) -- (!) 130 17 99 %     No intake/output data recorded.  No intake/output data recorded.    Current Facility-Administered Medications   Medication Dose Route Frequency    lactated ringers IV soln infusion   IntraVENous Continuous    ondansetron (ZOFRAN) injection 4 mg  4 mg IntraVENous Q6H PRN    Or    ondansetron (ZOFRAN-ODT) disintegrating tablet 4 mg  4 mg Oral Q6H PRN    oxytocin (PITOCIN) 30 units in 500 mL infusion  87.3 jennifer-units/min IntraVENous Continuous PRN    And    oxytocin (PITOCIN) 10 unit bolus from the bag  10 Units IntraVENous PRN    methylergonovine (METHERGINE) injection 200 mcg  200 mcg IntraMUSCular PRN    carboprost (HEMABATE) injection 250 mcg  250 mcg IntraMUSCular PRN    miSOPROStol (CYTOTEC) tablet 400 mcg  400 mcg Buccal PRN    tranexamic acid-NaCl IVPB premix 1,000 mg  1,000 mg IntraVENous Once PRN    benzocaine-menthol (DERMOPLAST) 20-0.5 % spray   Topical PRN    oxytocin (PITOCIN) 30 units in 500 mL infusion  1-20 jennifer-units/min IntraVENous Continuous    terbutaline (BRETHINE) injection 0.25 mg  0.25 mg SubCUTAneous Once PRN    lactated ringers bolus 500 mL  500 mL IntraVENous PRN    Or    
Patient off unit in stable condition by a wheelchair with volunteers for discharge home per MD. Patient is aware to follow up in 1-2 weeks for emotional check and 6 weeks for postpartum. Patient denies any HA, Dizziness, N/V, or pain at this time. Infant in carseat and discharged with mother.   
Post-Partum Day Number 1 Progress Note    Patient doing well post-partum without significant complaint.  Desires early discharge    Vitals:  Patient Vitals for the past 8 hrs:   BP Temp Temp src Pulse Resp SpO2   24 0744 95/63 97.9 °F (36.6 °C) Oral 93 16 98 %     Temp (24hrs), Av.2 °F (36.8 °C), Min:97.3 °F (36.3 °C), Max:99.3 °F (37.4 °C)      Vital signs stable, afebrile.    Exam:  Patient without distress.               Abdomen soft, fundus firm below umbilicus, nontender                              Lower extremities are negative for swelling, cords or tenderness.    Lab/Data Review:  All lab results for the last 24 hours reviewed.    Assessment and Plan:  Patient appears to be having uncomplicated post-partum course.  Continue routine perineal care and maternal education.  Plan discharge later today if still stable.   2. Hx of anxiety,?depression--precautions reviewed.  F/u in office in 7-14 days or prn.                   Cornelio Moreno MD  3/26/2024     
Yes